# Patient Record
Sex: FEMALE | Race: WHITE | NOT HISPANIC OR LATINO | Employment: PART TIME | URBAN - METROPOLITAN AREA
[De-identification: names, ages, dates, MRNs, and addresses within clinical notes are randomized per-mention and may not be internally consistent; named-entity substitution may affect disease eponyms.]

---

## 2017-05-09 ENCOUNTER — OFFICE VISIT - RIVER FALLS (OUTPATIENT)
Dept: FAMILY MEDICINE | Facility: CLINIC | Age: 27
End: 2017-05-09

## 2017-05-11 ENCOUNTER — OFFICE VISIT - RIVER FALLS (OUTPATIENT)
Dept: FAMILY MEDICINE | Facility: CLINIC | Age: 27
End: 2017-05-11

## 2017-12-08 ENCOUNTER — OFFICE VISIT - RIVER FALLS (OUTPATIENT)
Dept: FAMILY MEDICINE | Facility: CLINIC | Age: 27
End: 2017-12-08

## 2017-12-08 ENCOUNTER — COMMUNICATION - RIVER FALLS (OUTPATIENT)
Dept: FAMILY MEDICINE | Facility: CLINIC | Age: 27
End: 2017-12-08

## 2017-12-08 ASSESSMENT — MIFFLIN-ST. JEOR: SCORE: 1366.12

## 2019-07-15 ENCOUNTER — APPOINTMENT (OUTPATIENT)
Age: 29
Setting detail: DERMATOLOGY
End: 2019-07-16

## 2019-07-15 VITALS — RESPIRATION RATE: 16 BRPM | WEIGHT: 130 LBS | HEIGHT: 69 IN

## 2019-07-15 DIAGNOSIS — I83.9 ASYMPTOMATIC VARICOSE VEINS OF LOWER EXTREMITIES: ICD-10-CM

## 2019-07-15 DIAGNOSIS — D22 MELANOCYTIC NEVI: ICD-10-CM

## 2019-07-15 DIAGNOSIS — L81.4 OTHER MELANIN HYPERPIGMENTATION: ICD-10-CM

## 2019-07-15 PROBLEM — I83.93 ASYMPTOMATIC VARICOSE VEINS OF BILATERAL LOWER EXTREMITIES: Status: ACTIVE | Noted: 2019-07-15

## 2019-07-15 PROBLEM — D22.61 MELANOCYTIC NEVI OF RIGHT UPPER LIMB, INCLUDING SHOULDER: Status: ACTIVE | Noted: 2019-07-15

## 2019-07-15 PROCEDURE — 99214 OFFICE O/P EST MOD 30 MIN: CPT

## 2019-07-15 PROCEDURE — OTHER COUNSELING: OTHER

## 2019-07-15 ASSESSMENT — LOCATION DETAILED DESCRIPTION DERM
LOCATION DETAILED: RIGHT PROXIMAL LATERAL CALF
LOCATION DETAILED: RIGHT ANTERIOR SHOULDER
LOCATION DETAILED: LEFT PROXIMAL CALF
LOCATION DETAILED: RIGHT DISTAL POSTERIOR UPPER ARM

## 2019-07-15 ASSESSMENT — LOCATION SIMPLE DESCRIPTION DERM
LOCATION SIMPLE: RIGHT POSTERIOR UPPER ARM
LOCATION SIMPLE: RIGHT SHOULDER
LOCATION SIMPLE: LEFT CALF
LOCATION SIMPLE: RIGHT CALF

## 2019-07-15 ASSESSMENT — LOCATION ZONE DERM
LOCATION ZONE: LEG
LOCATION ZONE: ARM

## 2019-11-22 ENCOUNTER — AMBULATORY - RIVER FALLS (OUTPATIENT)
Dept: FAMILY MEDICINE | Facility: CLINIC | Age: 29
End: 2019-11-22

## 2020-03-19 ENCOUNTER — COMMUNICATION - RIVER FALLS (OUTPATIENT)
Dept: FAMILY MEDICINE | Facility: CLINIC | Age: 30
End: 2020-03-19

## 2020-03-28 ENCOUNTER — OFFICE VISIT - RIVER FALLS (OUTPATIENT)
Dept: FAMILY MEDICINE | Facility: CLINIC | Age: 30
End: 2020-03-28

## 2020-03-28 ENCOUNTER — COMMUNICATION - RIVER FALLS (OUTPATIENT)
Dept: FAMILY MEDICINE | Facility: CLINIC | Age: 30
End: 2020-03-28

## 2020-04-06 ENCOUNTER — COMMUNICATION - RIVER FALLS (OUTPATIENT)
Dept: FAMILY MEDICINE | Facility: CLINIC | Age: 30
End: 2020-04-06

## 2020-04-08 ENCOUNTER — COMMUNICATION - RIVER FALLS (OUTPATIENT)
Dept: FAMILY MEDICINE | Facility: CLINIC | Age: 30
End: 2020-04-08

## 2020-04-08 ENCOUNTER — OFFICE VISIT - RIVER FALLS (OUTPATIENT)
Dept: FAMILY MEDICINE | Facility: CLINIC | Age: 30
End: 2020-04-08

## 2020-04-08 LAB
BASOPHILS # BLD MANUAL: 0 10*3/UL
BASOPHILS NFR BLD MANUAL: 0.4 %
EOSINOPHIL # BLD MANUAL: 0.1 10*3/UL
EOSINOPHIL NFR BLD MANUAL: 1.7 %
ERYTHROCYTE [DISTWIDTH] IN BLOOD BY AUTOMATED COUNT: 13.1 % (ref 11.5–15.5)
ERYTHROCYTE [SEDIMENTATION RATE] IN BLOOD BY WESTERGREN METHOD: 11 MM/HR
HCT VFR BLD AUTO: 38.4 % (ref 33–51)
HETEROPHILE - HISTORICAL: NEGATIVE
HGB BLD-MCNC: 13.1 G/DL (ref 12–16)
LYMPHOCYTES # BLD MANUAL: 2.9 10*3/UL (ref 0.9–2.9)
LYMPHOCYTES NFR BLD MANUAL: 56 %
MCH RBC QN AUTO: 29.3 PG (ref 26–34)
MCHC RBC AUTO-ENTMCNC: 34.1 G/DL (ref 32–36)
MCV RBC AUTO: 86 FL (ref 80–100)
MONOCYTES # BLD MANUAL: 0.5 10*3/UL
MONOCYTES NFR BLD MANUAL: 9.2 %
NEUTROPHILS # BLD MANUAL: 1.7 10*3/UL (ref 1.7–7)
NEUTROPHILS NFR BLD MANUAL: 32.7 %
PLATELET # BLD AUTO: 209 10*3/UL (ref 140–440)
PMV BLD: 9.8 FL (ref 6.5–11)
RBC # BLD AUTO: 4.47 10*6/UL (ref 4–5.2)
WBC # BLD AUTO: 5.2 10*3/UL (ref 4.5–11)

## 2020-04-09 ENCOUNTER — COMMUNICATION - RIVER FALLS (OUTPATIENT)
Dept: FAMILY MEDICINE | Facility: CLINIC | Age: 30
End: 2020-04-09

## 2020-04-09 LAB
C REACTIVE PROTEIN LHE: 5.7 MG/L
CMV IGG ANTIBODY: 1.9 UNIT/ML
CMV IGM ANTIBODY: >240
TSH SERPL DL<=0.005 MIU/L-ACNC: 2.16 MIU/L

## 2020-04-10 ENCOUNTER — COMMUNICATION - RIVER FALLS (OUTPATIENT)
Dept: FAMILY MEDICINE | Facility: CLINIC | Age: 30
End: 2020-04-10

## 2020-07-13 ENCOUNTER — COMMUNICATION - RIVER FALLS (OUTPATIENT)
Dept: FAMILY MEDICINE | Facility: CLINIC | Age: 30
End: 2020-07-13

## 2021-12-13 ENCOUNTER — APPOINTMENT (OUTPATIENT)
Dept: URBAN - METROPOLITAN AREA CLINIC 260 | Age: 31
Setting detail: DERMATOLOGY
End: 2021-12-31

## 2021-12-13 VITALS — HEIGHT: 69 IN | WEIGHT: 130 LBS

## 2021-12-13 DIAGNOSIS — D18.0 HEMANGIOMA: ICD-10-CM

## 2021-12-13 DIAGNOSIS — D485 NEOPLASM OF UNCERTAIN BEHAVIOR OF SKIN: ICD-10-CM

## 2021-12-13 PROBLEM — D18.01 HEMANGIOMA OF SKIN AND SUBCUTANEOUS TISSUE: Status: ACTIVE | Noted: 2021-12-13

## 2021-12-13 PROBLEM — D23.5 OTHER BENIGN NEOPLASM OF SKIN OF TRUNK: Status: ACTIVE | Noted: 2021-12-13

## 2021-12-13 PROBLEM — D48.5 NEOPLASM OF UNCERTAIN BEHAVIOR OF SKIN: Status: ACTIVE | Noted: 2021-12-13

## 2021-12-13 PROCEDURE — OTHER COSMETIC CONSULTATION: PULSED-DYE LASER: OTHER

## 2021-12-13 PROCEDURE — OTHER MIPS QUALITY: OTHER

## 2021-12-13 PROCEDURE — 99212 OFFICE O/P EST SF 10 MIN: CPT

## 2021-12-13 PROCEDURE — OTHER COSMETIC CONSULTATION - PULSED-DYE LASER: OTHER

## 2021-12-13 PROCEDURE — OTHER COUNSELING: OTHER

## 2021-12-13 ASSESSMENT — LOCATION SIMPLE DESCRIPTION DERM
LOCATION SIMPLE: RIGHT CHEEK
LOCATION SIMPLE: LEFT CLAVICULAR SKIN

## 2021-12-13 ASSESSMENT — LOCATION ZONE DERM
LOCATION ZONE: FACE
LOCATION ZONE: TRUNK

## 2021-12-13 ASSESSMENT — LOCATION DETAILED DESCRIPTION DERM
LOCATION DETAILED: LEFT CLAVICULAR SKIN
LOCATION DETAILED: RIGHT INFERIOR CENTRAL MALAR CHEEK

## 2021-12-13 NOTE — PROCEDURE: MIPS QUALITY
Quality 110: Preventive Care And Screening: Influenza Immunization: Influenza immunization was not ordered or administered, reason not given
Detail Level: Detailed
Quality 431: Preventive Care And Screening: Unhealthy Alcohol Use - Screening: Patient not identified as an unhealthy alcohol user when screened for unhealthy alcohol use using a systematic screening method
Quality 130: Documentation Of Current Medications In The Medical Record: Current Medications Documented
Quality 226: Preventive Care And Screening: Tobacco Use: Screening And Cessation Intervention: Patient screened for tobacco use and is an ex/non-smoker

## 2021-12-13 NOTE — HPI: SKIN LESION
How Severe Is Your Skin Lesion?: mild
Has Your Skin Lesion Been Treated?: not been treated
Is This A New Presentation, Or A Follow-Up?: Skin Lesion
Additional History: Patient reports that the lesion sometimes fill up with pus.

## 2022-01-04 ENCOUNTER — APPOINTMENT (OUTPATIENT)
Dept: URBAN - METROPOLITAN AREA CLINIC 255 | Age: 32
Setting detail: DERMATOLOGY
End: 2022-01-05

## 2022-01-04 DIAGNOSIS — L57.8 OTHER SKIN CHANGES DUE TO CHRONIC EXPOSURE TO NONIONIZING RADIATION: ICD-10-CM

## 2022-01-04 DIAGNOSIS — D18.0 HEMANGIOMA: ICD-10-CM

## 2022-01-04 DIAGNOSIS — D22 MELANOCYTIC NEVI: ICD-10-CM

## 2022-01-04 DIAGNOSIS — L81.4 OTHER MELANIN HYPERPIGMENTATION: ICD-10-CM

## 2022-01-04 DIAGNOSIS — L82.1 OTHER SEBORRHEIC KERATOSIS: ICD-10-CM

## 2022-01-04 PROBLEM — D22.5 MELANOCYTIC NEVI OF TRUNK: Status: ACTIVE | Noted: 2022-01-04

## 2022-01-04 PROBLEM — D18.01 HEMANGIOMA OF SKIN AND SUBCUTANEOUS TISSUE: Status: ACTIVE | Noted: 2022-01-04

## 2022-01-04 PROCEDURE — 99213 OFFICE O/P EST LOW 20 MIN: CPT

## 2022-01-04 PROCEDURE — OTHER PULSED-DYE LASER: OTHER

## 2022-01-04 PROCEDURE — OTHER MIPS QUALITY: OTHER

## 2022-01-04 PROCEDURE — OTHER COUNSELING: OTHER

## 2022-01-04 ASSESSMENT — LOCATION ZONE DERM
LOCATION ZONE: TRUNK
LOCATION ZONE: FACE

## 2022-01-04 ASSESSMENT — LOCATION SIMPLE DESCRIPTION DERM
LOCATION SIMPLE: CHIN
LOCATION SIMPLE: RIGHT UPPER BACK
LOCATION SIMPLE: LEFT UPPER BACK
LOCATION SIMPLE: RIGHT CHEEK

## 2022-01-04 ASSESSMENT — LOCATION DETAILED DESCRIPTION DERM
LOCATION DETAILED: LEFT CHIN
LOCATION DETAILED: LEFT SUPERIOR MEDIAL UPPER BACK
LOCATION DETAILED: RIGHT SUPERIOR MEDIAL UPPER BACK
LOCATION DETAILED: RIGHT INFERIOR CENTRAL MALAR CHEEK
LOCATION DETAILED: RIGHT INFERIOR MEDIAL UPPER BACK
LOCATION DETAILED: RIGHT MEDIAL UPPER BACK

## 2022-01-04 NOTE — PROCEDURE: PULSED-DYE LASER
Pulse Duration: 10 ms
Delay Time (Ms): 20
Cryogen Time (Ms): 30
Consent: Written consent obtained, risks reviewed including but not limited to crusting, scabbing, blistering, scarring, darker or lighter pigmentary change, incidental hair removal, bruising, and/or incomplete removal.
Spot Size: 7 mm
Post-Procedure Care: Vaseline and ice applied. Post care reviewed with patient.
Laser Type: Vbeam 595nm
Spot Size: 10 mm
Detail Level: Zone
Pulse Count (Optional): 6
Fluence In J/Cm2 (Optional): 5.50
Post-Care Instructions: I reviewed with the patient in detail post-care instructions. Patient should stay away from the sun and wear sun protection until treated areas are fully healed.
Price (Use Numbers Only, No Special Characters Or $): 100.00

## 2022-02-12 VITALS
RESPIRATION RATE: 16 BRPM | WEIGHT: 130 LBS | OXYGEN SATURATION: 97 % | BODY MASS INDEX: 19.26 KG/M2 | TEMPERATURE: 97.6 F | SYSTOLIC BLOOD PRESSURE: 108 MMHG | HEIGHT: 69 IN | DIASTOLIC BLOOD PRESSURE: 68 MMHG | HEART RATE: 64 BPM

## 2022-02-12 VITALS
SYSTOLIC BLOOD PRESSURE: 110 MMHG | DIASTOLIC BLOOD PRESSURE: 64 MMHG | RESPIRATION RATE: 12 BRPM | HEART RATE: 74 BPM | TEMPERATURE: 98.3 F | OXYGEN SATURATION: 99 %

## 2022-02-16 NOTE — TELEPHONE ENCOUNTER
---------------------  From: Keyana LEAL, Gemma MATHIAS   To: Referral Coordinators Pool ([id number]_[facility]);     Sent: 4/8/2020 6:20:36 PM CDT  Subject: General Message     cancelled CT scan for this pt.  Thanks

## 2022-02-16 NOTE — PROGRESS NOTES
Chief Complaint     works with high risk. last saturday now feeling well with fever, headache, lingered till wednesday. today now ache, lathargic and fever of 100. right ear throbbing headache above right eye. verbal concent for phone visit  History of Present Illness      Today's visit was conducted via telephone due to the COVID-19 pandemic. PT consent to telephone visit was obtained and documented       Call Start Time:  3:56 pm _      Call End Time:    4:08 pm      Pt reports she has been ill 1 week with low grade fever, muscle ache and fatigue.  Has had R ear pain, throbbing discomfort in the R ear and a tension type HA from the base of the neck to the R eye/temple.  No sinus sx, facial pain or pressure.  Temp not greater than 100.6.  No cough or congestion.  No SOB or wheezing, chest pain. Pt reports she was feeling better for 2 days but then worse again last night and today with recurrence of the fever aches and ear pain.    Has not been out of the house  since 3-17-20 when she was in the clinic for [facility].  Her children have had similar sx but tolerating well and able to eat and drink well, play and be active.       Pt does have a hx of tension HA but this is not responding like typical tension HA.   Review of Systems      Review of systems is negative with the exception of those noted in HPI          Physical Exam      pt is in no acute distress and appears well      had pt come to the drive up and to look in the R ear.  B ears patent, TM intact, noninjected.  no air fluid level.          Assessment/Plan       1. Acute URI (J06.9)         continue to monitor sx, temp.  Isolate at home until fever free for 3 days,  continue social distancing.  Does not meet criteria for Covid-19 testing.                2. Otalgia of right ear (H92.01)         observation, push fluids. ibuprofen tylenol or EM for pain. warm compresses.  Ear discomfort may be more of a factor of tension HA and she does have hx of myfacial  pain which would anticipate to be worse with uri, fever, illness in general.   continue her general measures for that.  FU Prn.           Patient Information     Name:LIZETH MURRY      Address:      [address] [id number]     Sex:Female     YOB: 1990     [phone]     Emergency Contact:NARESH MURRY     MRN:[id number]     FIN:[id number]     Location:[facility]     Date of Service:03/28/2020      Primary Care Physician:       NONE ,       Attending Physician:       Keyana LEAL, Gemma MATHIAS, [phone]  Problem List/Past Medical History    Ongoing     Acne     Myofascial Pain    Historical     No qualifying data  Medications    Prenatal 1, 1 cap(s), Oral, daily  Allergies    Cats  Social History    Smoking Status - 03/28/2020     Never smoker     Alcohol - Current, 06/14/2013      Current, 1-2 times per week, 1 drinks/episode average., 06/14/2013     Exercise - Regular exercise, 06/14/2013      Exercise frequency: 3-4 times/week. Exercise type: Bicycling, Organized team sports, Swimming, Walking, Weight lifting., 06/14/2013     Substance Abuse - Denies Substance Abuse, 06/14/2013      Never, 06/14/2013     Tobacco - Denies Tobacco Use, 06/14/2013      Never, 06/14/2013  Family History    ALS - Amyotrophic lateral sclerosis: Grandfather (P).    Brain tumor: Grandmother (M).    Breast cancer: Grandmother (P).  Immunizations      Vaccine Date Status          influenza virus vaccine, inactivated 11/22/2019 Given          Hep A 06/07/2007 Recorded          tetanus/diphth/pertuss (Tdap) adult/adol 06/30/2006 Recorded          Hep B 10/06/1998 Recorded          Hep B 09/19/1997 Recorded          Hep B 08/19/1997 Recorded          OPV 05/12/1997 Recorded          DTaP 05/12/1997 Recorded          MMR (measles/mumps/rubella) 05/12/1997 Recorded

## 2022-02-16 NOTE — TELEPHONE ENCOUNTER
---------------------  From: Emy Zapata CMA (Phone Messages Pool ([address] [place] [address]))   To: [facility] Message Pool ([address] [place] [address]);     Sent: 7/13/2020 8:15:13 AM CDT  Subject: FW: Nodule on lung           ---------------------  From: LIZETH MURRY  To: [facility]  Sent: 07/12/2020 10:08 p.m. CDT  Subject: Nodule on lung  This past spring I came in for an x-ray to rule out pneumonia when I was sick. A nodule was found in my right lung. Elvira recommended a CT scan at a different facility at first, and then they said I d probably be safe waiting a year to see if there s been any changes with an new x-ray.    I am wondering if I can get a referral for a CT scan to rule out any long-term issues now. We d like to try for a 3rd baby and I will hopefully be pregnant when I would be due to get my x-ray.    Thank you,    Lizeth---------------------  From: Daisy Quan ([employer] Message Pool ([address] [place] [address]))   To: Oswaldo Block PA-C;     Sent: 7/13/2020 8:21:11 AM CDT  Subject: FW: Nodule on lung---------------------  From: Oswaldo Block PA-C   To: [employer] Message Pool ([id number]);     Sent: 7/13/2020 8:34:42 AM CDT  Subject: RE: Nodule on lung     referral placed for chest CT to evaluate pulmonary nodule---------------------  From: Daisy Quan ([employer] Message Pool ([facility]))   To: LIZETH MURRY    Sent: 7/13/2020 8:56:22 AM CDT  Subject: RE: Nodule on lung     Hi Aarynn,     I will give your CT order to radiology and they should call you today or tomorrow to set up a time for you to come in. Let us know if you need anything else. Have a good day!    KAMALA Rodriguez

## 2022-02-16 NOTE — TELEPHONE ENCOUNTER
---------------------  From: Precious Mesa LPN (Phone Messages Pool ([id number]_[facility]))   To: [employer] Message Pool ([address]_[place] [facility] [place]);     Sent: 4/10/2020 11:05:07 AM CDT  Subject: CONSUMER MESSAGE FW: Thank you           ---------------------  From: LIZETH MURRY  [facility]  Sent: 04/10/2020 10:46 a.m. CDT  Subject: Thank you  Just a quick message to say thank you for your response. I appreciate all of the information and it really put my mind at ease to hear that.    All the best to all of you. Thanks for all that you do!    Abigail Posey---------------------  From: Clif Wu CMA ([employer] Message Pool ([id number]_[facility]))   To: Gemma Ridley PA-C;     Sent: 4/10/2020 4:02:09 PM CDT  Subject: FW: CONSUMER MESSAGE FW: Thank you

## 2022-02-16 NOTE — TELEPHONE ENCOUNTER
---------------------  From: Emy Tao (Appointment Pool ([id number]_[facility]))   To: Phone Messages Pool ([facility]_[place] [facility] [place]);     Sent: 3/20/2020 9:29:20 AM CDT  Subject: RE: Patient Portal     Mom is listed as an authorized representative on Juan's account, so the invite was sent correctly. There is nothing more we can do on our side. She will need to contact IT at [facility] again. Thank you.      ---------------------  From: Yana Pratt CMA (Phone Messages Pool ([id number]_[facility]))   To: Appointment Pool ([facility]_[place] [facility] [place]);     Sent: 3/19/2020 4:38:40 PM CDT  Subject: FW: Patient Portal           ---------------------  From: LIZETH MURRY  To: [facility]  Sent: 03/19/2020 04:32 p.m. CDT  Subject: RE: Patient Portal  I spoke with Abhijeet from IT. He said to tell you that my portal invitation was set up incorrectly. He said to tell you that I already have a child on my profile, and that I would like to be the authorized user for this child. He acted like I should not be an invite to Juan via my email because I already have a profile, but that he just needed to be added to my current profile.    Let me know if there s anything else you need from me to set this up.    Thank you for your time,    Lizeth  ---------------------  From: Brenda Olivo RN (Phone Messages Pool ([id number]_[facility]))  To: LIZETH MURRY  Sent: 3/19/2020 3:04:14 PM CDT  Subject: RE: Patient Portal       Kenia García,       I would suggest calling the [facility] Patient Portal Support number at [phone].       Thank you,       Brenda Olivo RN            ---------------------  From: LIZETH MURRY  To: [facility] [place]  Sent: 03/19/2020 02:58 p.m. CDT  Subject: Patient Portal  To whom it may concern,  I received an email with the steps to add Juan to our portal. However, I keep getting a message saying there was an error processing my  invitation request, and it will not load the page to add Juan. I was able to put in his birthday and our zip code   and then I was able to put in my login information, and then the error page comes up.  I tried probably 10 different times, but the same thing happened each time.  Is there a way to get a new link sent?  Thank you for your time,  Lizeth---------------------  From: Precious Mesa LPN (Phone Messages Pool ([id number]_[facility]))   To: LIZETH MURRY    Sent: 3/20/2020 9:35:40 AM CDT  Subject: FW: Patient Portal     Kenroy García,    Please see message regarding Juan's account.    Thanks,  Precious PETER LPN

## 2022-02-16 NOTE — TELEPHONE ENCOUNTER
Entered by Ludmila Staples CMA on March 19, 2019 1:00:42 PM CDT  Saved message to correct patient EMR.      ---------------------  From: Lula Barton CMA (Phone Messages Pool ([id number]_[facility]))   To: [facility] Message Pool ([address]_[place] [facility] [place]);     Sent: 3/19/2019 7:56:53 AM CDT  Subject: FW: Juan Brady           ---------------------  From: LIZETH MURRY  To: [facility]  Sent: 03/18/2019 08:35 p.m. CDT  Subject: Juan Brady  Dear Dr. Reyna,    Thank you so much for checking in on Juan this afternoon. I was not sure how else to email you, so I m using [employer] account.    When you called my nephew was here too and I had two little boys running around distracting me. I apologize if I came across as short. Juan is doing really well and the bleeding is much better :)    Just wanted to double check with you to see if I still need to schedule a 2 week appointment, or if he s good to go until the 1 month?    Have a great week!    Lizeth Posey

## 2022-02-16 NOTE — NURSING NOTE
Quick Intake Entered On:  3/28/2020 3:52 PM CDT    Performed On:  3/28/2020 3:48 PM CDT by Raquel Mishra LPN               Summary   Chief Complaint :    works with high risk. last saturday now feeling well with fever, headache, lingered till wednesday. today now ache, lathargic and fever of 100. right ear throbbing headache above right eye. verbal concent for phone visit   Menstrual Status :   Menarcheal   Raquel Mishra LPN - 3/28/2020 3:48 PM CDT   Health Status   Allergies Verified? :   Yes   Medication History Verified? :   Yes   Medical History Verified? :   Yes   Pre-Visit Planning Status :   Completed   Tobacco Use? :   Never smoker   Raquel Mishra LPN - 3/28/2020 3:48 PM CDT   Consents   Consent for Immunization Exchange :   Consent Granted   Consent for Immunizations to Providers :   Consent Granted   Raquel Mishra LPN - 3/28/2020 3:48 PM CDT   Meds / Allergies   (As Of: 3/28/2020 3:52:05 PM CDT)   Allergies (Active)   Cats  Estimated Onset Date:   Unspecified ; Created By:   Emy Welch CMA; Reaction Status:   Active ; Category:   Drug ; Substance:   Cats ; Type:   Allergy ; Severity:   Mild ; Updated By:   Emy Welch CMA; Reviewed Date:   3/28/2020 3:48 PM CDT        Medication List   (As Of: 3/28/2020 3:52:05 PM CDT)   Home Meds    multivitamin, prenatal  :   multivitamin, prenatal ; Status:   Documented ; Ordered As Mnemonic:   Prenatal 1 ; Simple Display Line:   1 cap(s), po, daily, 0 Refill(s) ; Catalog Code:   multivitamin, prenatal ; Order Dt/Tm:   3/14/2016 3:32:20 PM CDT

## 2022-02-16 NOTE — PROGRESS NOTES
Patient:   LIZETH MURRY            MRN: [id number]            FIN: [id number]               Age:   27 years     Sex:  Female     :  1990   Associated Diagnoses:   Pharyngitis   Author:   Oswaldo Block PA-C      Chief Complaint   2017 11:50 AM CST   Pt here for sore throat,headache,dry cough,plugged ears x 3 days.  Pt was also exposed to strep by a friends child.      History of Present Illness   Chief complaint and symptoms noted above and confirmed with patient   as above, using ibuprofen, cough drops, tea      Review of Systems   Ear/Nose/Mouth/Throat:  Nasal congestion, Sore throat.    Respiratory:  Cough, No sputum production.    Neurologic:  Headache.       Health Status   Allergies:    Allergic Reactions (Selected)  Mild  Cats (No reactions were documented)   Problem list:    All Problems  Pregnant / SNOMED CT [id number] / Confirmed  Acne / ICD-9-.1 / Confirmed  Myofascial Pain / ICD-9-.1 / Confirmed   Medications:  (Selected)   Documented Medications  Documented  Prenatal 1: 1 cap(s), po, daily, 0 Refill(s), Type: Maintenance      Histories   Past Medical History:    Active  Acne (706.1)  Myofascial Pain (729.1)   Family History:    ALS - Amyotrophic lateral sclerosis  Grandfather (P)  Breast cancer  Grandmother (P) ()  Brain tumor  Grandmother (M)     Procedure history:    No active procedure history items have been selected or recorded.   Social History:        Alcohol Assessment: Current            Current, 1-2 times per week, 1 drinks/episode average.      Tobacco Assessment: Denies Tobacco Use            Never      Substance Abuse Assessment: Denies Substance Abuse            Never      Exercise and Physical Activity Assessment: Regular exercise            Exercise frequency: 3-4 times/week.  Exercise type: Bicycling, Organized team sports, Swimming, Walking,               Weight lifting.        Physical Examination   Vital Signs   2017 11:50 AM CST Temperature  Tympanic 97.6 DegF  LOW    Peripheral Pulse Rate 64 bpm    Respiratory Rate 16 br/min    Systolic Blood Pressure 108 mmHg    Diastolic Blood Pressure 68 mmHg    Mean Arterial Pressure 81 mmHg    BP Site Right arm    Oxygen Saturation 97 %      Measurements from flowsheet : Measurements   12/8/2017 11:50 AM CST Height Measured - Standard 68.5 in    Weight Measured - Standard 130 lb    BSA 1.69 m2    Body Mass Index 19.48 kg/m2      General:  No acute distress.    HENT:  Tympanic membranes are clear, No sinus tenderness, ooropharynx mildly enlarged and inflamed without exudate, nares are patent.    Neck:  Supple, mild anterior cervical lymphadenopathy, slight tenderness.    Respiratory:  Lungs are clear to auscultation.       Review / Management   Results review:       Interpretation: rapid strep is negative; culture pending, will call if abnormal results..       Impression and Plan   Diagnosis     Pharyngitis (CHW03-CP J02.9).     Patient Instructions:       Counseled: Fluids, salt water gargles, throat lozenges, NSAIDS; follow up if not improving.    Orders     Orders   Charges (Evaluation and Management):  28024 office outpatient visit 15 minutes (Charge) (Order): Quantity: 1, Pharyngitis.

## 2022-02-16 NOTE — TELEPHONE ENCOUNTER
---------------------  From: Sabiha Duncan CMA   To: RICHARD Message Pool ([id number]_[facility]);     Cc: Velia Moreno; Tabby Arnold;      Sent: 7/6/2021 3:31:12 PM CDT  Subject: General Message-XR/CT results      Phone Message    PCP:   RICHARD      Time of Call:  1516       Person Calling:  self  Phone number:  [phone], ok LM     Note:   pt requested records from [place] last month and nothing has been rec'd - CARTER could we send another request    Clif/RICHARD - pt delivered in [place] in April and at her p.p. check her OB mentioned they could just go ahead and scan her while she was there and advised that she f/u with her PCP - I couldn't find any imaging done @  on pt or in [facility] from this year only the CXR from 4/2020 that we have on file.  Pt couldn't  tell me if she had another XR or CT done.  I told her we would f/u with [place] in getting the records sent here and f/u when we receive    Last office visit and reason:  4/8/2020 fever/fatigue/h/aPt only visit with RICHARD was in 2017 for a sore throat.  Clif Wu CMAThis message was forwarded to [facility].  Clif Wu CMA---------------------  From: Velia Moreno   To: Sabiha Duncan CMA;     Sent: 7/6/2021 4:37:54 PM CDT  Subject: RE: General Message-XR/CT results      Refaxed CARTER as it looks like the first one actually didn't go through. Thanks

## 2022-02-16 NOTE — TELEPHONE ENCOUNTER
---------------------  From: Elisa Sampson CMA (Phone Messages Pool ([id number]_[facility]))   To: Gemma Ridley PA-C;     Sent: 4/6/2020 8:12:24 AM CDT  Subject: CONSUMER MESSAGE: FW: Still sick           ---------------------  From: LIZETH MURRY  To: [facility]  Sent: 04/03/2020 12:12 p.m. CDT  Subject: Still sick  I don t necessarily think I need to come in, but just wanted to make you aware that I m still sick. I came in last Saturday thinking maybe I had an ear infection and head a fever and headache. I still have had a fever every day since then. Today my headache is the worst its been, I have a fever, I m super tired, and I have a little bit of a sore throat.    I know that Elvira had said that Dr. Reyna had something similar that went away and came back. I m assuming I got it from her because she s the only person I ve really seen face to face in the last 3 weeks. I just wanted to double check that she didn t end up having Covid -19?    My boys have fevers again today too, but they don t seem to be tires or have a headache.      Thank you for your time.    Lizeth---------------------  From: Gemma Ridley PA-C   To: Phone Messages Pool ([id number]_[facility]);     Sent: 4/6/2020 10:25:03 AM CDT  Subject: RE: CONSUMER MESSAGE: FW: Still sick     I called lizeth back and discussed her sx.  Thanks

## 2022-02-16 NOTE — PROGRESS NOTES
Chief Complaint    fever  History of Present Illness      Chief complaint as above reviewed and confirmed with patient.  Pt presents to the clinic with concerns re: ongoing fever, fatigue, HA.  she has been ill over 2 weeks now.  Initially fever, HA, ear fullness, milder uri sx. Sons had similar but mild and improved and back to normal.  Raquel has had ongoing fevers up to 101 at times.  Bothered most by HA and fatigued. HA back of neck to temples. Feels similar to her tension HA but occurring daily.  Improves with 400 mg ibuprofen 1-2 x per day.  No significant cough, but in the last day noted a tightness in the lower chest around the bra line as well as mild sob when reading books with kids where she felt she had to take some deeper breaths.  No sob at rest, no chest pain. No nausea, vomiting. no abdominal pain.  Fatigue has been moderate the last 2 weeks taking 2-3 hour naps daily at times.  No vision changes, photophobia, phonophobia, no ST, rhinorrhea, facial pain or pressure.  Has never had mono.  She denies any known tick exposure.  No joint swelling or rash.  Review of Systems      Review of systems is negative with the exception of those noted in HPI          Physical Exam   Vitals & Measurements    T: 98.3   F (Tympanic)  HR: 74(Peripheral)  RR: 12  BP: 110/64  SpO2: 99%           Vitals as above per nursing documentation           Constitutional : nad appears well          Ears: ears patent B, TMS intact, noninjected           Nose: nasal mucosa is non-edematous. no discharge           Throat: pharynx is nonerythematous, no tonsillar hypertrophy, no exudate           Neck: neck supple, few small nontender anterior and posterior lymphnodes,  no thyromegaly, no rigidity           Lungs: lungs CTA', no Wheezes, rhonchi or rales           Heart: heart RRR, nl S1, S2 no murmur           skin:  No rashes            CXR: small 5 mm nodule  consistent with calcified granuloma, no comparison. Discussed with  radiologist and given low risk factors, age, and benign appearance reasonable to do CXR in 1 year to compare and if no change at that time not further monitoring needed.       CBC with Diff: WNL      sed rate WNL       Monospot negative      CMV, TSH, CRP pending          Assessment/Plan       1. Fever (R50.9)         likely prologned viral syndrome, will continue with conservative measures, observation.  Will take temps via a different thermometer to confirm hers is working well.  Will call pt with remainder of the results as they return.  treat HA with Ibuprofen or naproxen.  push fluids, close observation.          Ordered:          C-Reactive Protein* ([facility]), Specimen Type: Serum, Collection Date: 04/08/20 15:31:00 CDT          CBC w/ Diff/Plt (Request), Priority: Urgent, Fever  Fatigue          Cytomegalovirus Antibodies (IgG, IgM)* ([facility]), Specimen Type: Serum, Collection Date: 04/08/20 15:31:00 CDT          Mononucleosis Test, Qual (Request), Priority: Urgent, Fever  Fatigue          Sedimentation Rate, Westergren (Request), Priority: Urgent, Fever  Fatigue          TSH* ([facility]), Specimen Type: Serum, Collection Date: 04/08/20 15:31:00 CDT          XR Chest PA/Lat (Request), Priority: STAT, Fever  Fatigue                2. Fatigue (R53.83)         as above.         Ordered:          C-Reactive Protein* ([facility]), Specimen Type: Serum, Collection Date: 04/08/20 15:31:00 CDT          CBC w/ Diff/Plt (Request), Priority: Urgent, Fever  Fatigue          Cytomegalovirus Antibodies (IgG, IgM)* ([facility]), Specimen Type: Serum, Collection Date: 04/08/20 15:31:00 CDT          Mononucleosis Test, Qual (Request), Priority: Urgent, Fever  Fatigue          Sedimentation Rate, Westergren (Request), Priority: Urgent, Fever  Fatigue          TSH* ([facility]), Specimen Type: Serum, Collection Date: 04/08/20 15:31:00 CDT          XR Chest PA/Lat (Request), Priority: STAT, Fever  Fatigue                3. Pulmonary nodule,  right (R91.1)         1 year repeat CXR.         Ordered:          Review Orders for Potential Authorizations, 04/08/20 16:54:54 CDT           Patient Information     Name:LIZETH MURRY      Address:      [address] [id number]     Sex:Female     YOB: 1990     [phone]     Emergency Contact:NARESH MURRY     MRN:[id number]     FIN:[id number]     Location:[facility]     Date of Service:04/08/2020      Primary Care Physician:       Umair LEAL, Oswaldo CORREA, [phone]      Attending Physician:       Gemma Ridley PA-C, [phone]  Problem List/Past Medical History    Ongoing     Acne     Myofascial Pain    Historical     No qualifying data  Medications    Prenatal 1, 1 cap(s), Oral, daily  Allergies    Cats  Social History    Smoking Status - 03/28/2020     Never smoker     Alcohol - Current, 06/14/2013      Current, 1-2 times per week, 1 drinks/episode average., 06/14/2013     Exercise - Regular exercise, 06/14/2013      Exercise frequency: 3-4 times/week. Exercise type: Bicycling, Organized team sports, Swimming, Walking, Weight lifting., 06/14/2013     Substance Abuse - Denies Substance Abuse, 06/14/2013      Never, 06/14/2013     Tobacco - Denies Tobacco Use, 06/14/2013      Never, 06/14/2013  Family History    ALS - Amyotrophic lateral sclerosis: Grandfather (P).    Brain tumor: Grandmother (M).    Breast cancer: Grandmother (P).  Immunizations      Vaccine Date Status          influenza virus vaccine, inactivated 11/22/2019 Given          Hep A 06/07/2007 Recorded          tetanus/diphth/pertuss (Tdap) adult/adol 06/30/2006 Recorded          Hep B 10/06/1998 Recorded          Hep B 09/19/1997 Recorded          Hep B 08/19/1997 Recorded          OPV 05/12/1997 Recorded          DTaP 05/12/1997 Recorded          MMR (measles/mumps/rubella) 05/12/1997 Recorded  Lab Results       Lab Results (Last 4 results within 90 days)        WBC:  5.2 [4.5  - 11] (04/08/20 15:44:00)       RBC: 4.47 [4  - 5.2] (04/08/20 15:44:00)       Hgb: 13.1 [12 g/dL - 16 g/dL] (04/08/20 15:44:00)       Hct: 38.4 [33 % - 51 %] (04/08/20 15:44:00)       MCV: 86 [80 fL - 100 fL] (04/08/20 15:44:00)       MCH: 29.3 [26 pg - 34 pg] (04/08/20 15:44:00)       MCHC: 34.1 [32 g/dL - 36 g/dL] (04/08/20 15:44:00)       RDW: 13.1 [11.5 % - 15.5 %] (04/08/20 15:44:00)       Platelet: 209 [140  - 440] (04/08/20 15:44:00)       MPV: 9.8 [6.5 fL - 11 fL] (04/08/20 15:44:00)       Lymphocytes: 56.0 (04/08/20 15:44:00)       Abs Lymphocytes: 2.9 [0.9  - 2.9] (04/08/20 15:44:00)       Neutrophils: 32.7 (04/08/20 15:44:00)       Abs Neutrophils: 1.7 [1.7  - 7] (04/08/20 15:44:00)       Monocytes: 9.2 (04/08/20 15:44:00)       Abs Monocytes: 0.5 (04/08/20 15:44:00)       Eosinophils: 1.7 (04/08/20 15:44:00)       Abs Eosinophils: 0.1 (04/08/20 15:44:00)       Basophils: 0.4 (04/08/20 15:44:00)       Abs Basophils: 0.0 (04/08/20 15:44:00)       Sed Rate: 11 (04/08/20 15:44:00)       Heterophile Ab: Negative (04/08/20 15:44:00)

## 2022-02-16 NOTE — TELEPHONE ENCOUNTER
---------------------  From: Elisa Sampson CMA (Phone Messages Pool ([id number]))   To: ARM Message Pool ([id number]);     Sent: 3/19/2020 9:02:19 AM CDT  Subject: CONSUMER MESSAGE: FW: Juan's rash           ---------------------  From: LIZETH MURRY  To: [facility]  Sent: 03/19/2020 08:31 a.m. CDT  Subject: Juan s rash  Hi Dr. Reyna,    We ve been using the ointment for Juan s rash since Tuesday evening, and it seems like it s maybe gotten a little worse. It s still super red and he seems to be more bothered by it than he was before using the ointment. Even with clothes on he is constantly grabbing down there.    Is it normal for it to take a while to work? Or maybe even make it worse before it gets better?    Thank you for your time,    Lizeth---------------------  From: Izabel Hurd (ARM Message Pool (22224[facility]))   To: Emely Reyna MD;     Sent: 3/19/2020 11:11:06 AM CDT  Subject: FW: CONSUMER MESSAGE: FW: Juan's rash---------------------  From: Emely Reyna MD   To: ARM Message Pool ([id number]);     Sent: 3/19/2020 11:17:45 AM CDT  Subject: RE: CONSUMER MESSAGE: FW: Juan's rash     Please copy this message into patient's chart and resend to me that way for response.  Thanks.---------------------  From: Izabel Hurd (ARM Message Pool ([id number]))   To: LIZETH MURRY    Sent: 3/19/2020 11:22:43 AM CDT  Subject: FW: CONSUMER MESSAGE: FW: Juan's rash     This patient inquiry does not match the sender s portal name.  For security and privacy reasons we are not able to respond to the inquiry sent from this portal.  Please resubmit the inquiry from the patient s portal or call us directly.  Thank you.    we will call you with her response. We cannot talk about Juan through your chart. Next time your in the clinic we can set up a portal for him.     Thank you,    KAMALA Saravia

## 2022-02-16 NOTE — TELEPHONE ENCOUNTER
---------------------  From: Izabel Hurd (Phone Messages Pool (32224_[facility]))   To: Appointment Pool ([address]_[place] [facility] [place]);     Sent: 3/22/2020 8:11:35 AM CDT  Subject: FW: FW: Patient Portal           ---------------------  From: LIZETH MURRY  To: [facility]  Sent: 03/20/2020 08:38 p.m. CDT  Subject: RE: FW: Patient Portal  Thank you for your response.    If it was set up correctly, would you please try and send the link again? IT thinks you did something wrong and you think IT is telling me wrong. Either way, I get an error message on both my phone and my computer when I try to use the link you sent. I just need my son added to the account that I already have - just like my son Annmarie is.    I would greatly appreciate any help you can give me. I am not sure what else I can do.    Thank you for your time,    Lizeth       Addendum by Precious Mesa LPN on March 20, 2020 9:35:40 AM CDT  ---------------------  From: Precious Mesa LPN (Phone Messages Pool (32224_[facility]))  To: LIZETH MURRY  Sent: 3/20/2020 9:35:40 AM CDT  Subject: FW: Patient Portal       Kenroy García,       Please see message regarding [employer] account.       Thanks,  Precious PETER LPN  ---------------------  From: Emy Tao (Appointment Pool (32224_[facility]))  To: Phone Messages Pool (32224_[facility]);  Sent: 3/20/2020 9:29:20 AM CDT  Subject: RE: Patient Portal       Mom is listed as an authorized representative on [employer] account, so the invite was sent correctly. There is nothing more we can do on our side. She will need to contact IT at [facility] again. Thank you.            ---------------------  From: Yana Pratt CMA (Phone Messages Pool (32224_[facility]))  To: Appointment Pool ([facility]);  Sent: 3/19/2020 4:38:40 PM CDT  Subject: FW: Patient Portal                      ---------------------  From: LIZETH MURRY  To: [facility]  Sent: 03/19/2020 04:32 p.m. CDT  Subject: RE: Patient Portal  I spoke with Abhijeet from IT. He said to tell you that my portal invitation was set up incorrectly. He said to tell you that I already have a child on my profile, and that I would like to be the authorized user for this child. He acted like I should not be an invite to Juan via my email because I already have a profile, but that he just needed to be added to my current profile.  Let me know if there s anything else you need from me to set this up.  Thank you for your time,  Lizeth  ---------------------  From: Geovani BARKER, Brenda BIANEZ (Phone Messages Pool (32224_[facility]))  To: LIZETH MURRY  Sent: 3/19/2020 3:04:14 PM CDT  Subject: RE: Patient Portal       Kenia García,       I would suggest calling the  [employer] Patient Portal Support number at [phone].       Thank you,       Brenda Olivo RN            ---------------------  From: LIZETH MURRY  To: [facility]  Sent: 03/19/2020 02:58 p.m. CDT  Subject: Patient Portal  To whom it may concern,  I received an email with the steps to add Juan to our portal. However, I keep getting a message saying there was an error processing my invitation request, and it will not load the page to add Juan. I was able to put in his birthday and our zip code   and then I was able to put in my login information, and then the error page comes up.  I tried probably 10 different times, but the same thing happened each time.  Is there a way to get a new link sent?  Thank you for your time,  Lizeth

## 2022-02-16 NOTE — TELEPHONE ENCOUNTER
---------------------  From: LIZETH MURRY  To: [facility]  Sent: 03/23/2020 10:55 a.m. CDT  Subject: RE: FW: FW: Patient Portal  Thank you so much! It worked that time.  ???  Addendum by Denisha Nunez CMA on March 23, 2020 9:47:02 AM CDT  ---------------------  From: Denisha Nunez CMA (Phone Messages Pool ([id number]_[facility]))  To: LIZETH MURRY  Sent: 3/23/2020 9:47:02 AM CDT  Subject: FW: FW: Patient Portal  ---------------------  From: Angie Blue (Appointment Pool (32224_[facility]))  To: Phone Messages Pool (32224_[facility]);  Sent: 3/23/2020 7:46:57 AM CDT  Subject: RE: FW: Patient Portal  ???  Another invitation has been sent and hopefully that will solve the problem. Once it has been received open it and follow the steps to get Juan enrolled. Thank you!  ???  ???  ---------------------  From: Izabel Hurd (Phone Messages Pool (32224_[facility]))  To: Appointment Pool (32224_[facility]);  Sent: 3/22/2020 8:11:23 AM CDT  Subject: FW: FW: Patient Portal  ???  ???  ???  ???  ---------------------  From: LIZETH MURRY  To: [facility]  Sent: 03/20/2020 08:46 p.m. CDT  Subject: RE: FW: Patient Portal  Please show them the attached images. When I go to the link you provided and put in my son?s information it says that the information I entered is incorrect. When I enter my information, it says I already have an account.  IT says there?s nothing they can do on their end, and you say there?s nothing you can do on your end. I am just a little frustrated.  Please help!  ???  Addendum by Precious Mesa LPN on March 20, 2020 9:35:40 AM CDT  ---------------------  From: Precious Mesa LPN (Phone Messages Pool (32224_[facility]))  To: LIZETH MURRY  Sent: 3/20/2020 9:35:40 AM CDT  Subject: FW: Patient Portal  ???  Kenroy García,  ???  Please see message regarding Juan?s account.  ???  Thanks,  Precious PETER  MARGOTH  ---------------------  From: Emy Tao (Appointment Pool (32224_[facility]))  To: Phone Messages Pool (32224[facility]);  Sent: 3/20/2020 9:29:20 AM CDT  Subject: RE: Patient Portal  ???  Mom is listed as an authorized representative on Juan?s account, so the invite was sent correctly. There is nothing more we can do on our side. She will need to contact IT at [facility] again. Thank you.  ???  ???  ---------------------  From: Yana Pratt CMA (Phone Messages Pool (32224_[facility]))  To: Appointment Pool (32224_[facility]);  Sent: 3/19/2020 4:38:40 PM CDT  Subject: FW: Patient Portal  ???  ???  ???  ???  ---------------------  From: LIZETH MURRY  To: [facility]  Sent: 03/19/2020 04:32 p.m. CDT  Subject: RE: Patient Portal  I spoke with Abhijeet from IT. He said to tell you that my portal invitation was set up incorrectly. He said to tell you that I already have a child on my profile, and that I would like to be the authorized user for this child. He acted like I should not be an invite to [facility] via my email because I already have a profile, but that he just needed to be added to my current profile.  Let me know if there?s anything else you need from me to set this up.  Thank you for your time,  Lizeth  ---------------------  From: Geovani BARKER, Brenda IBANEZ (Phone Messages Pool (32224[facility]))  To: LIZETH MURRY  Sent: 3/19/2020 3:04:14 PM CDT  Subject: RE: Patient Portal  ???  Kenia García,  ???  I would suggest calling the [facility] Patient Portal Support number at [phone].  ???  Thank you,  ???  Brenda Olivo RN  ???  ???  ---------------------  From: LIZETH MURRY  To: [facility]  Sent: 03/19/2020 02:58 p.m. CDT  Subject: Patient Portal  To whom it may concern,  I received an email with the steps to add Juan to our portal. However, I keep getting a message saying there was an error processing  my invitation request, and it will not load the page to add Juan. I was able to put in his birthday and our zip code   and then I was able to put in my login information, and then the error page comes up.  I tried probably 10 different times, but the same thing happened each time.  Is there a way to get a new link sent?  Thank you for your time,  Raquel

## 2022-02-16 NOTE — TELEPHONE ENCOUNTER
---------------------  From: Denisha Nunez CMA (Phone Messages Pool (32224_[facility]))   To: BAM Message Pool (32224_[place] [facility] [place]);     Sent: 4/10/2020 7:59:45 AM CDT  Subject: FW: Rib pain           ---------------------  From: LIZETH MURRY  To: [facility]  Sent: 04/09/2020 07:06 p.m. 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 That makes a lot of sense now considering my symptoms. I have a couple of questions. Am I contagious? The nurse thought I probably wasn t anymore. Also, how long should we wait until we try to have kids? A month? 2 months? 3 months? How do I know I m in the clear?    Lastly, I ve really been battling that tightness/pain in my rib cage today. I thought it was on both sides, but hadn t really been paying attention before. I am noticing (at least today) it only seems to be on my right side. I ve tried stretching and rubbing it, but it s really uncomfortable today. Is there any chance that the nodule we say in my x-ray us actually near my ribs? I took a picture of where it s really hurting.    Just seems weird that I have any pain there because I really haven t been that active for the last few weeks.    Thank you for your time,    Lizeth---------------------  From: Emy Peraza   To: Gemma Ridley PA-C;     Sent: 4/10/2020 9:24:05 AM CDT  Subject: FW: Rib pain---------------------  From: Gemma Ridley PA-C   To: Emy Peraza; LIZETH MURRY    Sent: 4/10/2020 10:04:37 AM CDT  Subject: RE: Rib pain     Lizeth,     CMV: Hard to know exactly how long you have had it and how long you will be contageous but based on labs it is most consistent with illness for at least 3-4 weeks since the antibodies we see later in the infection are elevated.  Thus you are less likely to be contageous now but generally you should not be sharing foods/utensils  and you should wash hands well, cover cough, etc.    We generally do not restrict people from being around others when they have this illness (thus we allow adults to go to school/work and do no require strict isolation).  As far as being around your kids, continue your normal interactions.  More than likely you got this from them.  It is a very common illness and many adults and kids get it without even knowing they had it.      Great question about conceiving:  The biggest concern is GETTING the acute infection when you are pregnant.  There is a low but potential risk for congenital CMV in the baby when they are born.   I would try to prevent pregnancy until you are 100% back to normal in terms of no longer having any fevers, energy is better.  But honestly the biggest risk would have been last month at onset of the illness, which we can not prevent now.  To be safe I would say prevent pregnancy the next 1-2 months. Let your obgyn doctor know if you do not get your period as expected in the next 2 weeks.     Rib pain:  I do not think that is from the nodule.  Your nodule most likely has been there for some time, but hard to say for certain with out comparisons.  These nodules are 100% asymptomatic.  They usually are from previous infection (frequently fungal infections that are common in this area of the country). Most people never knew they had the problem.  It is always sings of prior infection in the past nothing new.  Rib pain is most likely from having a viral infection.  Costochondritis and pleurisy are very common with viral infections and can last weeks.  Hurts to deep breath, makes you feel some short of breath, sometimes to the touch but not always.  Should improve with ibuprofen or naproxen. It usually is from the lining of the chest wall and the lining of the lungs rubbing from inflammatory response. Unfortunately the rib pain may last for weeks.  I encourage deep breathing, movement because it helps  expand your lungs even though it is uncomfortable.    Felt very reassured your Chest xray was normal other than the small nodule, as well as normal oxygen level.      Let me know if you have other questions.    Have a great weekend!    Gemma

## 2022-02-16 NOTE — NURSING NOTE
Quick Intake Entered On:  4/8/2020 6:38 PM CDT    Performed On:  4/8/2020 6:37 PM CDT by Gemma Ridley PA-C               Summary   Chief Complaint :   fever    Menstrual Status :   Menarcheal   Systolic Blood Pressure :   110 mmHg   Diastolic Blood Pressure :   64 mmHg   Mean Arterial Pressure :   79 mmHg   Peripheral Pulse Rate :   74 bpm   BP Site :   Right arm   BP Method :   Manual   Temperature Tympanic :   98.3 DegF(Converted to: 36.8 DegC)    Respiratory Rate :   12 br/min (LOW)    Oxygen Saturation :   99 %   Gemma Ridley PA-C - 4/8/2020 6:37 PM CDT

## 2022-02-16 NOTE — TELEPHONE ENCOUNTER
---------------------  From: Angie Blue (Appointment Pool ([id number]))   To: Phone Messages Pool ([id number]);     Sent: 3/23/2020 7:46:57 AM CDT  Subject: RE: FW: Patient Portal     Another invitation has been sent and hopefully that will solve the problem. Once it has been received open it and follow the steps to get Juan enrolled. Thank you!      ---------------------  From: Izabel Hurd (Phone Messages Pool ([id number]))   To: Appointment Pool ([facility]224[facility]);     Sent: 3/22/2020 8:11:23 AM CDT  Subject: FW: FW: Patient Portal           ---------------------  From: LIZETH MURRY  To: [facility]  Sent: 03/20/2020 08:46 p.m. CDT  Subject: RE: FW: Patient Portal  Please show them the attached images. When I go to the link you provided and put in my son s information it says that the information I entered is incorrect. When I enter my information, it says I already have an account.    IT says there s nothing they can do on their end, and you say there s nothing you can do on your end. I am just a little frustrated.    Please help!       Addendum by Precious Mesa LPN on March 20, 2020 9:35:40 AM CDT  ---------------------  From: Precious Mesa LPN (Phone Messages Pool ([id number]))  To: LIZETH MURRY  Sent: 3/20/2020 9:35:40 AM CDT  Subject: FW: Patient Portal       Kenroy García,       Please see message regarding Juan s account.       Precious Larson LPN  ---------------------  From: Emy Tao (Appointment Pool ([facility]))  To: Phone Messages Pool ([id number]_[employer] [employer] [facility]);  Sent: 3/20/2020 9:29:20 AM CDT  Subject: RE: Patient Portal       Mom is listed as an authorized representative on Juan s account, so the invite was sent correctly. There is nothing more we can do on our side. She will need to contact IT at [facility] again. Thank you.            ---------------------  From:  Swanson1 Yana ALSTON (Phone Messages Pool ([id number]_[facility]))  To: Appointment Pool (32224_[facility]);  Sent: 3/19/2020 4:38:40 PM CDT  Subject: FW: Patient Portal                      ---------------------  From: LIZETH MURRY  To: [facility]  Sent: 03/19/2020 04:32 p.m. CDT  Subject: RE: Patient Portal  I spoke with Abhijeet from IT. He said to tell you that my portal invitation was set up incorrectly. He said to tell you that I already have a child on my profile, and that I would like to be the authorized user for this child. He acted like I should not be an invite to Juan via my email because I already have a profile, but that he just needed to be added to my current profile.  Let me know if there s anything else you need from me to set this up.  Thank you for your time,  Lizeth  ---------------------  From: Brenda Olivo RN (Phone Messages Pool ([id number]_[facility]))  To: LIZETH MURRY  Sent: 3/19/2020 3:04:14 PM CDT  Subject: RE: Patient Portal       Kenia García,       I would suggest calling the  [employer] Patient Portal Support number at [phone].       Thank you,       Brenda Olivo RN            ---------------------  From: LIZETH MRURY  To: [facility]  Sent: 03/19/2020 02:58 p.m. CDT  Subject: Patient Portal  To whom it may concern,  I received an email with the steps to add Juan to our portal. However, I keep getting a message saying there was an error processing my invitation request, and it will not load the page to add Juan. I was able to put in his birthday and our zip code   and then I was able to put in my login information, and then the error page comes up.  I tried probably 10 different times, but the same thing happened each time.  Is there a way to get a new link sent?  Thank you for your time,  Lizeth---------------------  From: Denisha Nunez CMA (Phone Messages Pool ([id number]_[facility]))   To:  LIZETH MURRY    Sent: 3/23/2020 9:47:02 AM CDT  Subject: FW: FW: Patient Portal

## 2022-02-16 NOTE — TELEPHONE ENCOUNTER
---------------------  From: Raqeul Mishra LPN (Phone Messages Pool ([id number]_[facility]))   To: LIZETH MURRY    Sent: 3/28/2020 3:39:38 PM CDT  Subject: FW: Sick     Lizeth,  Please call and set up a phone visit.  Raquel Mishra LPN      ---------------------  From: LIZETH MURRY  To: [facility]  Sent: 03/28/2020 03:35 p.m. CDT  Subject: Sick  We have been staying at home for 2 weeks now. The only time we left was to go to Juan s 1 year appt. Last Saturday I started to feel achy all over my body and had a low fever. Annmarie and Juan then had fevers on Sunday and we all had fevers until Wed. Highest it ever got was just above 100.    I had been feeling good, but now today again I am feeling achy, cold, and have a fever. My headache is awful and I am really lethargic. Do you think this is the flu?    I am a I ll baffled with how I got it. My  goes into work, but he s been healthy the whole time.

## 2022-02-16 NOTE — TELEPHONE ENCOUNTER
"Patient Information     Name:LIZETH MURRY      Address:      [address], [place] [id number]     Sex:Female     YOB: 1990     [phone]     Emergency Contact:NARESH MURRY     MRN:[id number]     FIN:[id number]     Location:[facility]     Date of Service:04/08/2020      Primary Care Physician:       Umair LEAL, Oswaldo CORREA, [phone]      Attending Physician:       Gemma Ridley PA-C, [phone]   Let message to call back for lab results.  If she returns call she may be given the following information.  I will be back in the clinic tomorrow and will call her to discuss if she wishes tomorrow.    1. Thyroid and CRP (inflammatory marker) were normal.    2. 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  CMV (cytomegalovirus) is a very common \"mono like\"  illness.  It explains her persistent fevers that come and go and excessive fatigue as well as headaches.  Some times sore throat or enlarged lymph nodes in the front or back of the neck.  It does not require any treatment and not harmful to her.  Children who had similar fevers but have improved around the same time may have had same. Fevers usually resolve after 3-6 weeks.  Fatigue may last up to 12 weeks.  There is no treatment, just supportive care.  Take good care of self taking plenty of fluids, resting, and eating healthy.  Ibuprofen/tylenol or naproxen for fevers and HA.   Patient called back advised per [facility]AM recommendations. Expressed understanding.  "

## 2022-02-16 NOTE — TELEPHONE ENCOUNTER
---------------------  From: Ludmila Staples CMA   To: LIZETH MURRY    Sent: 3/19/2019 2:47:22 PM CDT  Subject: General Message       Hi Juan García's weight was above birth weight at our visit yesterday, so only schedule the 2 week appointment if you have questions.  I am glad the circumcision is doing well.  Keep the Vaseline on any part that is still healing for at least the next two weeks and let me know if you have questions.  I will see him for sure at the 1 month visit.      (And no worries, you did not seem short!)    Thanks, and enjoy that sweet baby!    MD Jessica

## 2022-02-16 NOTE — TELEPHONE ENCOUNTER
---------------------  From: Bonita JUSTIN Precious FISHER (Phone Messages Pool ([id number]_[facility]))   To: [employer] Message Pool ([id number]_[place] [facility] [place]);     Sent: 4/8/2020 10:04:27 AM CDT  Subject: CONSUMER MESSAGE FW: Questions for Elvira           ---------------------  From: LIZETH MURRY  To: [facility]  Sent: 04/08/2020 10:02 a.m. CDT  Subject: Questions for Elvira  Hi Elvira,    Thank you for consistently talking me through this. I just wanted to check in. I have had a fever of 100 to 101 again the last 2 days (since we last talked). I have had a fever pretty regularly for 2.5 weeks now. My headache and throbbing ear remain persistent as well but I can manage them fairly well with occasionally taking ibuprofen.    Since Monday I have had some tightness along where the elastic of my bra would be. I thought maybe my bra was too tight or something and tried taking it off, and the tightness sort of came and went the last couple of days. However today, I woke up with that tightness and it has not gone away all morning. I have a fever of 101. My breathing seems fairly normal except that I noticed having to take deeper breaths while reading stories to my boys.    Not sure if any of this is a concern, but it s just been frustrating that I cannot get rid of my fever and headache.    Thank you for your time. I hope you re having a great day!    Holden Posey also LM at 11:30 with same message---------------------  From: Denisha Nunez CMA ([employer] Message Pool ([id number]_[facility]))   To: Keyana LEAL, Gemma MATHIAS;     Sent: 4/8/2020 11:53:29 AM CDT  Subject: FW: CONSUMER MESSAGE FW: Questions for Sujit was advised to come into clinic for face to face evaluation, she has apt   at 3 pm, will call back if she needs to change apt.       SANTA Nunez CMA

## 2022-02-16 NOTE — LETTER
(Inserted Image. Unable to display)         April 12, 2021      LIZETH MURRY  [address] [place] [address]          Dear LIZETH,      Thank you for selecting [facility] for your healthcare needs.    Our records indicate you are due for the following services:     Repeat Chest X-Ray.    (FYI   Regarding office visits: In some instances, a video visit or telephone visit may be offered as an option.)      To schedule an appointment or if you have further questions, please contact your clinic at [phone].      Powered by [employer]    Sincerely,    Gemma Ridley PA-C

## 2022-02-16 NOTE — TELEPHONE ENCOUNTER
Patient Information     Name:LIEZTH MURRY      Address:      [address] [place], [place] [id number]     Sex:Female     YOB: 1990     [phone]     Emergency Contact:NARESH MURRY     MRN:[id number]     FIN:[id number]     Location:[facility]     Date of Service:04/06/2020      Primary Care Physician:       Umair LEAL, Oswaldo CORREA, [phone]   Called pt to discuss ongoing sx.  She has low grade fever, minimal rhinorrhea, congestion.  HA is most bothersome. Grinding teeth some so her teeth are a little sensitive.  No sob, difficulty breathing, ears full. Temp today 99.6 without fever reducer.  Fatigued.  Reassured that she may continue to monitor. One child with similar sx at home still.  They have been sick about 2 weeks but seem to be with mild sx but happy to reassess should sx worsen.

## 2022-03-04 NOTE — TELEPHONE ENCOUNTER
---------------------  From: Denisha Nunez CMA (Phone Messages Pool (32224_[facility]))   To: [employer] Message Pool (32224_[place] [facility] [place]);     Sent: 4/10/2020 7:59:45 AM CDT  Subject: FW: Rib pain           ---------------------  From: LIZETH MURRY  To: [facility]  Sent: 04/09/2020 07:06 p.m. 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 That makes a lot of sense now considering my symptoms. I have a couple of questions. Am I contagious? The nurse thought I probably wasn t anymore. Also, how long should we wait until we try to have kids? A month? 2 months? 3 months? How do I know I m in the clear?    Lastly, I ve really been battling that tightness/pain in my rib cage today. I thought it was on both sides, but hadn t really been paying attention before. I am noticing (at least today) it only seems to be on my right side. I ve tried stretching and rubbing it, but it s really uncomfortable today. Is there any chance that the nodule we say in my x-ray us actually near my ribs? I took a picture of where it s really hurting.    Just seems weird that I have any pain there because I really haven t been that active for the last few weeks.    Thank you for your time,    Lizeth  ---------------------  From: Emy Peraza   To: Gemma Ridley PA-C;     Sent: 4/10/2020 9:24:05 AM CDT  Subject: FW: Rib pain  ---------------------  From: Gemma Ridley PA-C   To: Emy Peraza; LIZETH MURRY    Sent: 4/10/2020 10:04:37 AM CDT  Subject: RE: Rib pain     Lizeth,     CMV: Hard to know exactly how long you have had it and how long you will be contageous but based on labs it is most consistent with illness for at least 3-4 weeks since the antibodies we see later in the infection are elevated.  Thus you are less likely to be contageous now but generally you should not be sharing  foods/utensils and you should wash hands well, cover cough, etc.   We generally do not restrict people from being around others when they have this illness (thus we allow adults to go to school/work and do no require strict isolation).  As far as being around your kids, continue your normal interactions.  More than likely you got this from them.  It is a very common illness and many adults and kids get it without even knowing they had it.      Great question about conceiving:  The biggest concern is GETTING the acute infection when you are pregnant.  There is a low but potential risk for congenital CMV in the baby when they are born.   I would try to prevent pregnancy until you are 100% back to normal in terms of no longer having any fevers, energy is better.  But honestly the biggest risk would have been last month at onset of the illness, which we can not prevent now.  To be safe I  would say prevent pregnancy the next 1-2 months. Let your obgyn doctor know if you do not get your period as expected in the next 2 weeks.     Rib pain:  I do not think that is from the nodule.  Your nodule most likely has been there for some time, but hard to say for certain with out comparisons.  These nodules are 100% asymptomatic.  They usually are from previous infection (frequently fungal infections that are common in this area of the country). Most people never knew they had the problem.  It is always sings of prior infection in the past nothing new.  Rib pain is most likely  from having a viral infection.  Costochondritis and pleurisy are very common with viral infections and can last weeks.  Hurts to deep breath, makes you feel some short of breath, sometimes to the touch but not always.  Should improve with ibuprofen or naproxen. It usually is from the lining of the chest wall and the lining of the lungs rubbing from inflammatory response. Unfortunately the rib pain may last for weeks.  I encourage deep  breathing, movement  because it helps expand your lungs even though it is uncomfortable.    Felt very reassured your Chest xray was normal other than the small nodule, as well as normal oxygen level.      Let me know if you have other questions.    Have a great weekend!    Gemma

## 2022-04-03 ENCOUNTER — HEALTH MAINTENANCE LETTER (OUTPATIENT)
Age: 32
End: 2022-04-03

## 2022-10-03 ENCOUNTER — HEALTH MAINTENANCE LETTER (OUTPATIENT)
Age: 32
End: 2022-10-03

## 2022-12-30 PROBLEM — L70.9 ACNE: Status: ACTIVE | Noted: 2022-12-30

## 2022-12-30 RX ORDER — VITAMIN A ACETATE, BETA CAROTENE, ASCORBIC ACID, CHOLECALCIFEROL, .ALPHA.-TOCOPHEROL ACETATE, DL-, THIAMINE MONONITRATE, RIBOFLAVIN, NIACINAMIDE, PYRIDOXINE HYDROCHLORIDE, FOLIC ACID, CYANOCOBALAMIN, CALCIUM CARBONATE, FERROUS FUMARATE, ZINC OXIDE, CUPRIC OXIDE 3080; 12; 120; 400; 1; 1.84; 3; 20; 22; 920; 25; 200; 27; 10; 2 [IU]/1; UG/1; MG/1; [IU]/1; MG/1; MG/1; MG/1; MG/1; MG/1; [IU]/1; MG/1; MG/1; MG/1; MG/1; MG/1
1 TABLET, FILM COATED ORAL DAILY
COMMUNITY

## 2022-12-30 RX ORDER — LORATADINE 10 MG/1
10 TABLET ORAL DAILY
COMMUNITY

## 2023-01-03 ENCOUNTER — ANCILLARY PROCEDURE (OUTPATIENT)
Dept: GENERAL RADIOLOGY | Facility: CLINIC | Age: 33
End: 2023-01-03
Payer: MEDICAID

## 2023-01-03 ENCOUNTER — OFFICE VISIT (OUTPATIENT)
Dept: FAMILY MEDICINE | Facility: CLINIC | Age: 33
End: 2023-01-03
Payer: MEDICAID

## 2023-01-03 VITALS
HEART RATE: 64 BPM | HEIGHT: 68 IN | SYSTOLIC BLOOD PRESSURE: 106 MMHG | RESPIRATION RATE: 20 BRPM | TEMPERATURE: 97.8 F | BODY MASS INDEX: 20.46 KG/M2 | WEIGHT: 135 LBS | DIASTOLIC BLOOD PRESSURE: 64 MMHG

## 2023-01-03 DIAGNOSIS — R91.8 PULMONARY NODULES: ICD-10-CM

## 2023-01-03 DIAGNOSIS — R91.8 PULMONARY NODULES: Primary | ICD-10-CM

## 2023-01-03 PROCEDURE — 71046 X-RAY EXAM CHEST 2 VIEWS: CPT | Mod: TC | Performed by: RADIOLOGY

## 2023-01-03 PROCEDURE — 99213 OFFICE O/P EST LOW 20 MIN: CPT | Performed by: PHYSICIAN ASSISTANT

## 2023-01-03 ASSESSMENT — ENCOUNTER SYMPTOMS
EYE PAIN: 0
HEADACHES: 1
COUGH: 0
SORE THROAT: 0
ARTHRALGIAS: 0
PALPITATIONS: 0
WEAKNESS: 0
HEMATURIA: 0
SHORTNESS OF BREATH: 0
DYSURIA: 0
CHILLS: 0
DIZZINESS: 0
NAUSEA: 0
MYALGIAS: 0
NERVOUS/ANXIOUS: 0
PARESTHESIAS: 0
CONSTIPATION: 0
JOINT SWELLING: 0
HEMATOCHEZIA: 0
FEVER: 0
HEARTBURN: 0
DIARRHEA: 0
BREAST MASS: 0
FREQUENCY: 0
ABDOMINAL PAIN: 0

## 2023-01-03 NOTE — PROGRESS NOTES
"  1. Pulmonary nodules  CXR appears to show stable pulmonary nodule, will await radiologist reading and inform patient      - XR Chest 2 Views; Future      Subjective   Rqauel is a 32 year old accompanied by her self, presenting for the following health issues:  Follow Up (Pt here for Follow-up on \"Lung nodule\" that was found 4/8/2020)      Healthy Habits:     Getting at least 3 servings of Calcium per day:  Yes    Bi-annual eye exam:  Yes    Dental care twice a year:  Yes    Sleep apnea or symptoms of sleep apnea:  None    Diet:  Regular (no restrictions)    Frequency of exercise:  4-5 days/week    Duration of exercise:  30-45 minutes    Taking medications regularly:  Not Applicable    Barriers to taking medications:  None    PHQ-2 Total Score: 0    Additional concerns today:  No     In April 2020 patient had CXR which indicated a 5 mm nodule inferior to right fifth rib consistent with calcified granuloma, it was recommended to have that CXR repeated in a year.  She is here today to have that CXR repeated          Review of Systems   Constitutional: Negative for chills and fever.   HENT: Negative for congestion, ear pain, hearing loss and sore throat.    Eyes: Negative for pain and visual disturbance.   Respiratory: Negative for cough and shortness of breath.    Cardiovascular: Negative for chest pain, palpitations and peripheral edema.   Gastrointestinal: Negative for abdominal pain, constipation, diarrhea, heartburn, hematochezia and nausea.   Breasts:  Negative for tenderness, breast mass and discharge.   Genitourinary: Positive for vaginal discharge. Negative for dysuria, frequency, genital sores, hematuria, pelvic pain, urgency and vaginal bleeding.   Musculoskeletal: Negative for arthralgias, joint swelling and myalgias.   Skin: Negative for rash.   Neurological: Positive for headaches. Negative for dizziness, weakness and paresthesias.   Psychiatric/Behavioral: Negative for mood changes. The patient is not " "nervous/anxious.             Objective    /64 (BP Location: Right arm, Patient Position: Sitting, Cuff Size: Adult Regular)   Pulse 64   Temp 97.8  F (36.6  C) (Tympanic)   Resp 20   Ht 1.734 m (5' 8.25\")   Wt 61.2 kg (135 lb)   LMP 01/31/2022 (Exact Date)   BMI 20.38 kg/m    Body mass index is 20.38 kg/m .  Physical Exam  Vitals reviewed.   Constitutional:       Appearance: Normal appearance.   Cardiovascular:      Rate and Rhythm: Normal rate and regular rhythm.      Pulses: Normal pulses.      Heart sounds: Normal heart sounds.   Pulmonary:      Effort: Pulmonary effort is normal.      Breath sounds: Normal breath sounds.   Neurological:      Mental Status: She is alert.            CXR - Reviewed and interpreted by me: small pulmonary nodule in right lower lobe, otherwise normal  awaiting formal interpretation from Radiologist at this time                "

## 2023-03-23 ENCOUNTER — APPOINTMENT (OUTPATIENT)
Dept: URBAN - METROPOLITAN AREA CLINIC 260 | Age: 33
Setting detail: DERMATOLOGY
End: 2023-03-30

## 2023-03-23 VITALS — HEIGHT: 68 IN | WEIGHT: 132 LBS

## 2023-03-23 DIAGNOSIS — L259 CONTACT DERMATITIS AND OTHER ECZEMA, UNSPECIFIED CAUSE: ICD-10-CM

## 2023-03-23 DIAGNOSIS — Z71.89 OTHER SPECIFIED COUNSELING: ICD-10-CM

## 2023-03-23 DIAGNOSIS — L81.4 OTHER MELANIN HYPERPIGMENTATION: ICD-10-CM

## 2023-03-23 DIAGNOSIS — L72.8 OTHER FOLLICULAR CYSTS OF THE SKIN AND SUBCUTANEOUS TISSUE: ICD-10-CM

## 2023-03-23 DIAGNOSIS — D22 MELANOCYTIC NEVI: ICD-10-CM

## 2023-03-23 DIAGNOSIS — D18.0 HEMANGIOMA: ICD-10-CM

## 2023-03-23 DIAGNOSIS — L82.1 OTHER SEBORRHEIC KERATOSIS: ICD-10-CM

## 2023-03-23 PROBLEM — L23.9 ALLERGIC CONTACT DERMATITIS, UNSPECIFIED CAUSE: Status: ACTIVE | Noted: 2023-03-23

## 2023-03-23 PROBLEM — D22.5 MELANOCYTIC NEVI OF TRUNK: Status: ACTIVE | Noted: 2023-03-23

## 2023-03-23 PROBLEM — D18.01 HEMANGIOMA OF SKIN AND SUBCUTANEOUS TISSUE: Status: ACTIVE | Noted: 2023-03-23

## 2023-03-23 PROCEDURE — 99213 OFFICE O/P EST LOW 20 MIN: CPT

## 2023-03-23 PROCEDURE — OTHER ADDITIONAL NOTES: OTHER

## 2023-03-23 PROCEDURE — OTHER MIPS QUALITY: OTHER

## 2023-03-23 PROCEDURE — OTHER COUNSELING: OTHER

## 2023-03-23 ASSESSMENT — LOCATION SIMPLE DESCRIPTION DERM
LOCATION SIMPLE: LEFT LIP
LOCATION SIMPLE: UPPER BACK

## 2023-03-23 ASSESSMENT — LOCATION ZONE DERM
LOCATION ZONE: LIP
LOCATION ZONE: TRUNK

## 2023-03-23 ASSESSMENT — LOCATION DETAILED DESCRIPTION DERM
LOCATION DETAILED: LEFT UPPER CUTANEOUS LIP
LOCATION DETAILED: INFERIOR THORACIC SPINE

## 2023-03-23 NOTE — HPI: FULL BODY SKIN EXAMINATION
What Type Of Note Output Would You Prefer (Optional)?: Standard Output
What Is The Reason For Today's Visit?: Full Body Skin Examination
What Is The Reason For Today's Visit? (Being Monitored For X): concerning skin lesions on an annual basis
Additional History: Patient reports area of concern above the lip. She has not had this area treated and it has not changed since it developed

## 2023-03-23 NOTE — PROCEDURE: ADDITIONAL NOTES
Detail Level: Simple
Render Risk Assessment In Note?: no
Additional Notes: Discussed fragrances in products and perfumes and the potential for skin irritation while using.

## 2023-05-20 ENCOUNTER — HEALTH MAINTENANCE LETTER (OUTPATIENT)
Age: 33
End: 2023-05-20

## 2023-12-06 ENCOUNTER — APPOINTMENT (OUTPATIENT)
Dept: URBAN - METROPOLITAN AREA CLINIC 260 | Age: 33
Setting detail: DERMATOLOGY
End: 2023-12-07

## 2023-12-06 VITALS — HEIGHT: 55 IN | WEIGHT: 150 LBS

## 2023-12-06 DIAGNOSIS — D492 NEOPLASM OF UNSPECIFIED NATURE OF BONE, SOFT TISSUE, AND SKIN: ICD-10-CM

## 2023-12-06 DIAGNOSIS — D22 MELANOCYTIC NEVI: ICD-10-CM

## 2023-12-06 DIAGNOSIS — L81.4 OTHER MELANIN HYPERPIGMENTATION: ICD-10-CM

## 2023-12-06 DIAGNOSIS — Z71.89 OTHER SPECIFIED COUNSELING: ICD-10-CM

## 2023-12-06 DIAGNOSIS — D18.0 HEMANGIOMA: ICD-10-CM

## 2023-12-06 DIAGNOSIS — L72.0 EPIDERMAL CYST: ICD-10-CM

## 2023-12-06 PROBLEM — D22.5 MELANOCYTIC NEVI OF TRUNK: Status: ACTIVE | Noted: 2023-12-06

## 2023-12-06 PROBLEM — R22.41 LOCALIZED SWELLING, MASS AND LUMP, RIGHT LOWER LIMB: Status: ACTIVE | Noted: 2023-12-06

## 2023-12-06 PROBLEM — D18.01 HEMANGIOMA OF SKIN AND SUBCUTANEOUS TISSUE: Status: ACTIVE | Noted: 2023-12-06

## 2023-12-06 PROCEDURE — 99213 OFFICE O/P EST LOW 20 MIN: CPT

## 2023-12-06 PROCEDURE — OTHER MIPS QUALITY: OTHER

## 2023-12-06 PROCEDURE — OTHER COUNSELING: OTHER

## 2023-12-06 ASSESSMENT — LOCATION SIMPLE DESCRIPTION DERM
LOCATION SIMPLE: RIGHT PRETIBIAL REGION
LOCATION SIMPLE: LEFT FOREHEAD
LOCATION SIMPLE: LOWER BACK
LOCATION SIMPLE: UPPER BACK

## 2023-12-06 ASSESSMENT — LOCATION DETAILED DESCRIPTION DERM
LOCATION DETAILED: SUPERIOR LUMBAR SPINE
LOCATION DETAILED: INFERIOR THORACIC SPINE
LOCATION DETAILED: RIGHT DISTAL PRETIBIAL REGION
LOCATION DETAILED: LEFT FOREHEAD

## 2023-12-06 ASSESSMENT — LOCATION ZONE DERM
LOCATION ZONE: FACE
LOCATION ZONE: LEG
LOCATION ZONE: TRUNK

## 2024-04-04 ENCOUNTER — MEDICAL CORRESPONDENCE (OUTPATIENT)
Dept: HEALTH INFORMATION MANAGEMENT | Facility: CLINIC | Age: 34
End: 2024-04-04

## 2024-05-09 ENCOUNTER — MEDICAL CORRESPONDENCE (OUTPATIENT)
Dept: HEALTH INFORMATION MANAGEMENT | Facility: CLINIC | Age: 34
End: 2024-05-09
Payer: MEDICAID

## 2024-09-13 ENCOUNTER — MEDICAL CORRESPONDENCE (OUTPATIENT)
Dept: HEALTH INFORMATION MANAGEMENT | Facility: CLINIC | Age: 34
End: 2024-09-13
Payer: MEDICAID

## 2024-10-05 ENCOUNTER — HEALTH MAINTENANCE LETTER (OUTPATIENT)
Age: 34
End: 2024-10-05